# Patient Record
Sex: FEMALE | Race: WHITE | Employment: OTHER | URBAN - NONMETROPOLITAN AREA
[De-identification: names, ages, dates, MRNs, and addresses within clinical notes are randomized per-mention and may not be internally consistent; named-entity substitution may affect disease eponyms.]

---

## 2017-03-02 ENCOUNTER — HOSPITAL ENCOUNTER (OUTPATIENT)
Dept: GENERAL RADIOLOGY | Age: 67
Discharge: HOME | End: 2017-03-02
Payer: MEDICARE

## 2017-03-02 DIAGNOSIS — M54.17 RADICULOPATHY, LUMBOSACRAL REGION: ICD-10-CM

## 2017-03-02 PROCEDURE — 72110 X-RAY EXAM L-2 SPINE 4/>VWS: CPT

## 2017-03-02 PROCEDURE — 72100 X-RAY EXAM L-S SPINE 2/3 VWS: CPT

## 2020-08-04 ENCOUNTER — TRANSCRIBE ORDERS (OUTPATIENT)
Dept: ADMINISTRATIVE | Facility: HOSPITAL | Age: 70
End: 2020-08-04

## 2023-01-13 ENCOUNTER — TRANSCRIBE ORDERS (OUTPATIENT)
Dept: CT IMAGING | Facility: CLINIC | Age: 73
End: 2023-01-13
Payer: MEDICARE

## 2023-01-13 DIAGNOSIS — I38 VALVULAR HEART DISEASE: Primary | ICD-10-CM

## 2023-01-18 ENCOUNTER — TRANSCRIBE ORDERS (OUTPATIENT)
Dept: ADMINISTRATIVE | Age: 73
End: 2023-01-18

## 2023-01-18 DIAGNOSIS — M54.2 NECK PAIN WITHOUT INJURY: Primary | ICD-10-CM

## 2023-01-26 ENCOUNTER — HOSPITAL ENCOUNTER (OUTPATIENT)
Dept: MRI IMAGING | Age: 73
Discharge: HOME | End: 2023-01-26
Payer: MEDICARE

## 2023-01-26 DIAGNOSIS — M54.2 NECK PAIN WITHOUT INJURY: ICD-10-CM

## 2023-01-26 PROCEDURE — 72141 MRI NECK SPINE W/O DYE: CPT

## 2024-10-02 NOTE — PROGRESS NOTES
Pt is here today for a new pt visit for vaginal bleeding x 4 months, pt states she thinks her bladder has fell  
Community Support     Help with Day-to-Day Activities: Not on file     Lonely or Isolated: Not on file   Intimate Partner Violence: Unknown (10/11/2023)    Received from [facility]    Abuse Screen     Unsafe at Home or Work/School: Not on file     Feels Threatened by Someone?: Not on file     Does Anyone Keep You from Contacting Others or Doint Things Outside the Home?: Not on file     Physical Sign of Abuse Present: Not on file   Housing Stability: Unknown (10/11/2023)    Received from [facility]    Housing Stability     Current Living Arrangements: Not on file     Potentially Unsafe Housing Conditions: Not on file         Current Outpatient Medications:     citalopram (CELEXA) 20 MG tablet, Take 1 tablet by mouth daily, Disp: , Rfl:     furosemide (LASIX) 20 MG tablet, Take 1 tablet by mouth daily, Disp: , Rfl:     HYDROcodone-acetaminophen (NORCO)  MG per tablet, TAKE ONE TABLET BY MOUTH THREE TIMES DAILY. MUST LAST 30 DAYS, Disp: , Rfl:     pantoprazole (PROTONIX) 40 MG tablet, Take 1 tablet by mouth daily, Disp: , Rfl:     raloxifene (EVISTA) 60 MG tablet, Take 1 tablet by mouth daily, Disp: , Rfl:     topiramate (TOPAMAX) 50 MG tablet, Take 1 tablet by mouth daily, Disp: , Rfl:     Allergies   Allergen Reactions    Bactrim [Sulfamethoxazole-Trimethoprim] Hives    Codeine Hives    Penicillin G Itching       /74 (Site: Left Upper Arm, Position: Sitting, Cuff Size: Medium Adult)   Pulse 74   Ht 1.651 m (5' 5\")   Wt 57.2 kg (126 lb)   BMI 20.97 kg/m²   Physical Exam  Constitutional:       General: She is not in acute distress.     Appearance: Normal appearance. She is not ill-appearing or diaphoretic.   HENT:      Head: Normocephalic and atraumatic.      Nose: Nose normal. No rhinorrhea.   Eyes:      General: No scleral icterus.        Right eye: No discharge.         Left eye: No discharge.      Extraocular Movements: Extraocular movements intact.   Pulmonary:

## 2024-10-03 ENCOUNTER — OFFICE VISIT (OUTPATIENT)
Dept: OBGYN CLINIC | Age: 74
End: 2024-10-03

## 2024-10-03 VITALS
HEIGHT: 65 IN | WEIGHT: 126 LBS | HEART RATE: 74 BPM | BODY MASS INDEX: 20.99 KG/M2 | SYSTOLIC BLOOD PRESSURE: 116 MMHG | DIASTOLIC BLOOD PRESSURE: 74 MMHG

## 2024-10-03 DIAGNOSIS — N95.0 POST-MENOPAUSAL BLEEDING: Primary | ICD-10-CM

## 2024-10-03 DIAGNOSIS — N95.0 POST-MENOPAUSAL BLEEDING: ICD-10-CM

## 2024-10-03 DIAGNOSIS — R19.00 PELVIC MASS IN FEMALE: ICD-10-CM

## 2024-10-03 DIAGNOSIS — R63.4 ABNORMAL WEIGHT LOSS: ICD-10-CM

## 2024-10-03 DIAGNOSIS — N93.9 VAGINAL BLEEDING: ICD-10-CM

## 2024-10-03 DIAGNOSIS — N93.9 EXCESSIVE VAGINAL BLEEDING: ICD-10-CM

## 2024-10-03 DIAGNOSIS — N89.8 VAGINAL LESION: ICD-10-CM

## 2024-10-03 LAB
ALBUMIN SERPL-MCNC: 4 G/DL (ref 3.5–5.2)
ALP SERPL-CCNC: 82 U/L (ref 35–104)
ALT SERPL-CCNC: 7 U/L (ref 5–33)
ANION GAP SERPL CALCULATED.3IONS-SCNC: 11 MMOL/L (ref 7–19)
AST SERPL-CCNC: 11 U/L (ref 5–32)
BILIRUB SERPL-MCNC: 0.4 MG/DL (ref 0.2–1.2)
BUN SERPL-MCNC: 15 MG/DL (ref 8–23)
CALCIUM SERPL-MCNC: 8.9 MG/DL (ref 8.8–10.2)
CANCER AG125 SERPL-ACNC: 6 U/ML (ref 0–38)
CHLORIDE SERPL-SCNC: 106 MMOL/L (ref 98–111)
CO2 SERPL-SCNC: 24 MMOL/L (ref 22–29)
CREAT SERPL-MCNC: 0.8 MG/DL (ref 0.5–0.9)
ERYTHROCYTE [DISTWIDTH] IN BLOOD BY AUTOMATED COUNT: 13.9 % (ref 11.5–14.5)
GLUCOSE SERPL-MCNC: 83 MG/DL (ref 70–99)
HCT VFR BLD AUTO: 39.7 % (ref 37–47)
HGB BLD-MCNC: 12.6 G/DL (ref 12–16)
MCH RBC QN AUTO: 29.4 PG (ref 27–31)
MCHC RBC AUTO-ENTMCNC: 31.7 G/DL (ref 33–37)
MCV RBC AUTO: 92.8 FL (ref 81–99)
PLATELET # BLD AUTO: 233 K/UL (ref 130–400)
PMV BLD AUTO: 9.8 FL (ref 9.4–12.3)
POTASSIUM SERPL-SCNC: 4 MMOL/L (ref 3.5–5)
PROT SERPL-MCNC: 6.9 G/DL (ref 6.4–8.3)
RBC # BLD AUTO: 4.28 M/UL (ref 4.2–5.4)
SODIUM SERPL-SCNC: 141 MMOL/L (ref 136–145)
WBC # BLD AUTO: 9.6 K/UL (ref 4.8–10.8)

## 2024-10-03 RX ORDER — CITALOPRAM HYDROBROMIDE 20 MG/1
20 TABLET ORAL DAILY
COMMUNITY
Start: 2024-07-19

## 2024-10-03 RX ORDER — RALOXIFENE HYDROCHLORIDE 60 MG/1
60 TABLET, FILM COATED ORAL DAILY
COMMUNITY
Start: 2024-07-19

## 2024-10-03 RX ORDER — PANTOPRAZOLE SODIUM 40 MG/1
40 TABLET, DELAYED RELEASE ORAL DAILY
COMMUNITY
Start: 2024-07-19

## 2024-10-03 RX ORDER — HYDROCODONE BITARTRATE AND ACETAMINOPHEN 10; 325 MG/1; MG/1
TABLET ORAL
COMMUNITY
Start: 2024-09-04

## 2024-10-03 RX ORDER — TOPIRAMATE 50 MG/1
50 TABLET, FILM COATED ORAL DAILY
COMMUNITY
Start: 2024-07-19

## 2024-10-03 RX ORDER — FUROSEMIDE 20 MG
20 TABLET ORAL DAILY
COMMUNITY
Start: 2024-07-19

## 2024-10-08 LAB
HPV HR 12 DNA SPEC QL NAA+PROBE: NOT DETECTED
HPV16 DNA SPEC QL NAA+PROBE: NOT DETECTED
HPV16+18+H RISK 12 DNA SPEC-IMP: NORMAL
HPV18 DNA SPEC QL NAA+PROBE: NOT DETECTED

## 2024-12-26 ENCOUNTER — HOSPITAL ENCOUNTER (OUTPATIENT)
Dept: CT IMAGING | Age: 74
Discharge: HOME | End: 2024-12-26
Attending: OBSTETRICS & GYNECOLOGY
Payer: MEDICARE

## 2024-12-26 DIAGNOSIS — R19.00 PELVIC MASS IN FEMALE: ICD-10-CM

## 2024-12-26 DIAGNOSIS — N93.9 EXCESSIVE VAGINAL BLEEDING: ICD-10-CM

## 2024-12-26 DIAGNOSIS — N95.0 POST-MENOPAUSAL BLEEDING: ICD-10-CM

## 2024-12-26 DIAGNOSIS — R63.4 ABNORMAL WEIGHT LOSS: ICD-10-CM

## 2024-12-26 PROCEDURE — 6360000004 HC RX CONTRAST MEDICATION: Performed by: OBSTETRICS & GYNECOLOGY

## 2024-12-26 PROCEDURE — 74177 CT ABD & PELVIS W/CONTRAST: CPT

## 2024-12-26 RX ORDER — IOPAMIDOL 755 MG/ML
75 INJECTION, SOLUTION INTRAVASCULAR
Status: COMPLETED | OUTPATIENT
Start: 2024-12-26 | End: 2024-12-26

## 2024-12-26 RX ADMIN — IOPAMIDOL 75 ML: 755 INJECTION, SOLUTION INTRAVENOUS at 15:07

## 2025-01-15 ENCOUNTER — TELEPHONE (OUTPATIENT)
Dept: OBGYN CLINIC | Age: 75
End: 2025-01-15

## 2025-01-15 NOTE — TELEPHONE ENCOUNTER
Contacted radiology dept to request stat reading for pending CT from 12/26/24.     Pt daughter also informed.

## 2025-01-15 NOTE — TELEPHONE ENCOUNTER
Patient Daughter is requesting a return call in regards to CT results. Patient had CT completed on 12/26. Please return her call.    Thank you!

## 2025-01-21 ENCOUNTER — TELEPHONE (OUTPATIENT)
Dept: OBGYN CLINIC | Age: 75
End: 2025-01-21

## 2025-01-21 NOTE — TELEPHONE ENCOUNTER
----- Message from ADAMS MATAMOROS LPN sent at 1/21/2025  1:19 PM CST -----  Can someone call this patient's daughter and let her know that the CT came back okay. Dr novoa wants her to make an appt with a NP to follow up with her vaginal atrophy from here. The daughter has been calling.

## 2025-02-27 ENCOUNTER — OFFICE VISIT (OUTPATIENT)
Dept: OBGYN CLINIC | Age: 75
End: 2025-02-27
Payer: MEDICAID

## 2025-02-27 VITALS
HEART RATE: 80 BPM | WEIGHT: 122 LBS | DIASTOLIC BLOOD PRESSURE: 72 MMHG | SYSTOLIC BLOOD PRESSURE: 121 MMHG | BODY MASS INDEX: 20.3 KG/M2

## 2025-02-27 DIAGNOSIS — R10.2 PELVIC PAIN: ICD-10-CM

## 2025-02-27 DIAGNOSIS — N81.10 VAGINAL PROLAPSE: ICD-10-CM

## 2025-02-27 DIAGNOSIS — N93.9 VAGINAL BLEEDING: ICD-10-CM

## 2025-02-27 DIAGNOSIS — N95.2 VAGINAL ATROPHY: Primary | ICD-10-CM

## 2025-02-27 PROCEDURE — 1123F ACP DISCUSS/DSCN MKR DOCD: CPT | Performed by: NURSE PRACTITIONER

## 2025-02-27 PROCEDURE — G8427 DOCREV CUR MEDS BY ELIG CLIN: HCPCS | Performed by: NURSE PRACTITIONER

## 2025-02-27 PROCEDURE — G8400 PT W/DXA NO RESULTS DOC: HCPCS | Performed by: NURSE PRACTITIONER

## 2025-02-27 PROCEDURE — G8420 CALC BMI NORM PARAMETERS: HCPCS | Performed by: NURSE PRACTITIONER

## 2025-02-27 PROCEDURE — 99214 OFFICE O/P EST MOD 30 MIN: CPT | Performed by: NURSE PRACTITIONER

## 2025-02-27 PROCEDURE — 1036F TOBACCO NON-USER: CPT | Performed by: NURSE PRACTITIONER

## 2025-02-27 PROCEDURE — 1090F PRES/ABSN URINE INCON ASSESS: CPT | Performed by: NURSE PRACTITIONER

## 2025-02-27 PROCEDURE — 3017F COLORECTAL CA SCREEN DOC REV: CPT | Performed by: NURSE PRACTITIONER

## 2025-02-27 RX ORDER — ESTRADIOL 0.1 MG/G
CREAM VAGINAL
Qty: 1 EACH | Refills: 3 | Status: SHIPPED | OUTPATIENT
Start: 2025-02-27

## 2025-02-27 ASSESSMENT — ENCOUNTER SYMPTOMS
CONSTIPATION: 0
DIARRHEA: 0
ALLERGIC/IMMUNOLOGIC NEGATIVE: 1
EYES NEGATIVE: 1
RESPIRATORY NEGATIVE: 1
ABDOMINAL PAIN: 1

## 2025-02-27 NOTE — PROGRESS NOTES
Tatiana Camejo is a 74 y.o. female who presents today for her medical conditions/ complaints as noted below. Tatiana Camejo is c/o of Vaginal Atrophy         HPI  History of Present Illness  The patient presents for evaluation of vaginal bleeding and bladder prolapse.    She has been experiencing significant bladder discomfort since April 2024. History of partial hysterectomy performed due to uterine prolapse. She reports a sensation of incomplete bladder emptying and urinary incontinence, necessitating the constant use of a pad. She also experiences abdominal pain during ambulation. She has previously undergone bladder surgery to correct a prolapse, which has since recurred. Pt reports \"bladder tack.\" She recalls the use of a rubber device for bladder support in the past, which was associated with pain. She maintains an active lifestyle, including performing laundry duties, but occasionally experiences constipation and straining during bowel movements.    Supplemental Information  She had osteoporosis before and is currently on Evista (raloxifene) for osteoporosis prevention. She broke her bones and was diagnosed with osteoporosis. She received injections and was later put on Evista.    MEDICATIONS  Current: Evista (raloxifene)      No LMP recorded. Patient has had a hysterectomy.  No obstetric history on file.    Past Medical History:   Diagnosis Date    Arthritis      Past Surgical History:   Procedure Laterality Date    BLADDER SURGERY      GALLBLADDER SURGERY      HERNIA REPAIR      PARTIAL HYSTERECTOMY (CERVIX NOT REMOVED)       History reviewed. No pertinent family history.  Social History     Tobacco Use    Smoking status: Never    Smokeless tobacco: Never   Substance Use Topics    Alcohol use: Never       Current Outpatient Medications   Medication Sig Dispense Refill    estradiol (ESTRACE VAGINAL) 0.1 MG/GM vaginal cream Insert 1 gram vaginally at bedtime x7 nights, and then 3 nights a week 1 each 3

## 2025-04-15 ENCOUNTER — OFFICE VISIT (OUTPATIENT)
Dept: OBGYN CLINIC | Age: 75
End: 2025-04-15

## 2025-04-15 VITALS
BODY MASS INDEX: 20.16 KG/M2 | WEIGHT: 121 LBS | DIASTOLIC BLOOD PRESSURE: 68 MMHG | HEIGHT: 65 IN | SYSTOLIC BLOOD PRESSURE: 128 MMHG

## 2025-04-15 DIAGNOSIS — N93.9 VAGINAL BLEEDING: ICD-10-CM

## 2025-04-15 DIAGNOSIS — N81.10 VAGINAL PROLAPSE: ICD-10-CM

## 2025-04-15 DIAGNOSIS — N95.2 VAGINAL ATROPHY: Primary | ICD-10-CM

## 2025-04-15 DIAGNOSIS — N90.89 VULVAR LESION: ICD-10-CM

## 2025-04-15 ASSESSMENT — ENCOUNTER SYMPTOMS
CONSTIPATION: 0
EYES NEGATIVE: 1
DIARRHEA: 0
RESPIRATORY NEGATIVE: 1
GASTROINTESTINAL NEGATIVE: 1
ALLERGIC/IMMUNOLOGIC NEGATIVE: 1

## 2025-04-15 NOTE — PROGRESS NOTES
Constitutional: Negative.    HENT:  Positive for hearing loss.    Eyes: Negative.    Respiratory: Negative.     Cardiovascular: Negative.    Gastrointestinal: Negative.  Negative for constipation and diarrhea.   Endocrine: Negative.    Genitourinary:  Positive for genital sores, vaginal bleeding and vaginal pain. Negative for frequency, menstrual problem and urgency.   Musculoskeletal: Negative.    Skin: Negative.    Allergic/Immunologic: Negative.    Neurological: Negative.    Hematological: Negative.    Psychiatric/Behavioral: Negative.     All other systems reviewed and are negative.        Physical Exam  Vitals and nursing note reviewed.   Constitutional:       Appearance: She is well-developed.   HENT:      Head: Normocephalic.      Right Ear: External ear normal.      Left Ear: External ear normal.      Nose: Nose normal.   Genitourinary:     Labia:         Right: Lesion present.       Vagina: Erythema, tenderness, bleeding, lesions and prolapsed vaginal walls present.          Comments: Small 1cm irregularly shaped lesion at 7 o clock to vaginal introitus, rough patch, white  Bleeding and atrophy at base of vaginal introitus, small fissure present  Grade 3 cystocele  Vaginal atrophy and narrowing, firm ridge of tissue present      Vulvar Biopsy Procedure Note    Pre-operative Diagnosis: Vulvar Lesion    Post-operative Diagnosis: Vulvar Lesion    Indications: Lesion    Procedure Details    The risks (including infection, bleeding, pain) and benefits of the procedure were explained to the patient and Verbal informed consent was obtained.      The patient was placed in the dorsal lithotomy position. Lesion visualized and cleansed with betadine. 1cc of 2% lidocaine injected subcutaneously. 3mm punch biopsy used to excise small amount of lesion. No sutures needed. Hemostasis achieved with silver nitrate. Sent for pathology. Pt tolerated well.       Condition:  Stable    Complications:  None    Plan:    The

## 2025-04-22 ENCOUNTER — RESULTS FOLLOW-UP (OUTPATIENT)
Dept: OBGYN CLINIC | Age: 75
End: 2025-04-22

## 2025-04-22 RX ORDER — TRIAMCINOLONE ACETONIDE 1 MG/G
OINTMENT TOPICAL 2 TIMES DAILY
Qty: 1 EACH | Refills: 0 | Status: SHIPPED | OUTPATIENT
Start: 2025-04-22 | End: 2025-04-29

## 2025-04-24 ENCOUNTER — TELEPHONE (OUTPATIENT)
Dept: OBGYN CLINIC | Age: 75
End: 2025-04-24

## 2025-04-24 NOTE — TELEPHONE ENCOUNTER
Patient Daughter is requesting a return in regards to Biopsy results. Please return call.    Thank you!

## 2025-04-25 ENCOUNTER — TELEPHONE (OUTPATIENT)
Dept: OBGYN CLINIC | Age: 75
End: 2025-04-25

## 2025-04-25 NOTE — TELEPHONE ENCOUNTER
Patient daughter called stating that the medication that was sent in to the pharmacy is not working for her mom.Told patient daughter that the provider and medical assistant is not here this afternoon and that they will get back with her on Monday.

## 2025-05-09 ENCOUNTER — OFFICE VISIT (OUTPATIENT)
Dept: OBGYN CLINIC | Age: 75
End: 2025-05-09
Payer: MEDICAID

## 2025-05-09 VITALS
HEART RATE: 80 BPM | SYSTOLIC BLOOD PRESSURE: 127 MMHG | DIASTOLIC BLOOD PRESSURE: 78 MMHG | BODY MASS INDEX: 20.05 KG/M2 | WEIGHT: 120.5 LBS

## 2025-05-09 DIAGNOSIS — Z46.89 ENCOUNTER FOR FITTING AND ADJUSTMENT OF PESSARY: ICD-10-CM

## 2025-05-09 DIAGNOSIS — N95.2 VAGINAL ATROPHY: Primary | ICD-10-CM

## 2025-05-09 DIAGNOSIS — R10.2 PELVIC PAIN: ICD-10-CM

## 2025-05-09 DIAGNOSIS — N81.10 VAGINAL PROLAPSE: ICD-10-CM

## 2025-05-09 DIAGNOSIS — N93.9 VAGINAL BLEEDING: ICD-10-CM

## 2025-05-09 PROCEDURE — 1090F PRES/ABSN URINE INCON ASSESS: CPT | Performed by: NURSE PRACTITIONER

## 2025-05-09 PROCEDURE — G8400 PT W/DXA NO RESULTS DOC: HCPCS | Performed by: NURSE PRACTITIONER

## 2025-05-09 PROCEDURE — 57160 INSERT PESSARY/OTHER DEVICE: CPT | Performed by: NURSE PRACTITIONER

## 2025-05-09 PROCEDURE — G8420 CALC BMI NORM PARAMETERS: HCPCS | Performed by: NURSE PRACTITIONER

## 2025-05-09 PROCEDURE — G8427 DOCREV CUR MEDS BY ELIG CLIN: HCPCS | Performed by: NURSE PRACTITIONER

## 2025-05-09 PROCEDURE — 3017F COLORECTAL CA SCREEN DOC REV: CPT | Performed by: NURSE PRACTITIONER

## 2025-05-09 PROCEDURE — 1123F ACP DISCUSS/DSCN MKR DOCD: CPT | Performed by: NURSE PRACTITIONER

## 2025-05-09 PROCEDURE — 99214 OFFICE O/P EST MOD 30 MIN: CPT | Performed by: NURSE PRACTITIONER

## 2025-05-09 PROCEDURE — 1036F TOBACCO NON-USER: CPT | Performed by: NURSE PRACTITIONER

## 2025-05-09 RX ORDER — ESTRADIOL 0.1 MG/G
CREAM VAGINAL
Qty: 1 EACH | Refills: 3 | Status: SHIPPED | OUTPATIENT
Start: 2025-05-09

## 2025-05-09 ASSESSMENT — ENCOUNTER SYMPTOMS
CONSTIPATION: 0
ALLERGIC/IMMUNOLOGIC NEGATIVE: 1
RESPIRATORY NEGATIVE: 1
DIARRHEA: 0
ABDOMINAL PAIN: 1

## 2025-05-09 NOTE — PROGRESS NOTES
Pt presents today with c/o bladder prolapse, significant pelvic pain and estradiol not helping. States cream does not work.

## 2025-05-09 NOTE — PROGRESS NOTES
Tatiana Camejo is a 74 y.o. female who presents today for her medical conditions/ complaints as noted below. Tatiana Camejo is c/o of No chief complaint on file.        HPI  History of Present Illness  The patient presents for evaluation of bladder prolapse.    She reports persistent discomfort due to a sensation of a full bladder, which she believes is causing her inability to urinate. She also experiences ongoing bleeding. The severity of her symptoms has escalated to the point where she is unable to walk or stand without experiencing significant pain. She has been using a cream three times weekly but reports no improvement in her condition. She has been utilizing a heating pad for pain relief. Her urinary output is minimal, often going an entire day without urinating. Despite maintaining adequate hydration, she continues to experience these symptoms. She also reports difficulty with bowel movements, describing them as painful. She has undergone diagnostic imaging, including CT and MRI scans, tissue biopsies in vagina and vulva which have not revealed any abnormalities in her abdomen.    Interested in next steps with second opinion with Uro/Gyn and surgical options. Has attempted a pessary with another office and states \"it fell out.\" Pt strongly desires intervention \"today, because I am miserable with it hanging out.\"    No LMP recorded. Patient has had a hysterectomy.      Past Medical History:   Diagnosis Date    Arthritis      Past Surgical History:   Procedure Laterality Date    BLADDER SURGERY      GALLBLADDER SURGERY      HERNIA REPAIR      PARTIAL HYSTERECTOMY (CERVIX NOT REMOVED)       No family history on file.  Social History     Tobacco Use    Smoking status: Never    Smokeless tobacco: Never   Substance Use Topics    Alcohol use: Never       Current Outpatient Medications   Medication Sig Dispense Refill    estradiol (ESTRACE VAGINAL) 0.1 MG/GM vaginal cream Insert 1 gram vaginally at bedtime x7

## 2025-05-10 ENCOUNTER — HOSPITAL ENCOUNTER (EMERGENCY)
Age: 75
Discharge: HOME | End: 2025-05-10
Payer: MEDICARE

## 2025-05-10 VITALS
OXYGEN SATURATION: 96 % | DIASTOLIC BLOOD PRESSURE: 66 MMHG | RESPIRATION RATE: 18 BRPM | TEMPERATURE: 98.3 F | HEART RATE: 76 BPM | SYSTOLIC BLOOD PRESSURE: 128 MMHG

## 2025-05-10 DIAGNOSIS — T83.89XA VAGINAL IRRITATION FROM PESSARY: Primary | ICD-10-CM

## 2025-05-10 DIAGNOSIS — N89.8 VAGINAL IRRITATION FROM PESSARY: Primary | ICD-10-CM

## 2025-05-10 PROCEDURE — 99282 EMERGENCY DEPT VISIT SF MDM: CPT

## 2025-05-10 NOTE — DISCHARGE INSTRUCTIONS
Today you were seen for your pessary coming out during the night.  In the ER it is nothing we would place back.  You do need to follow-up with your gynecologist to have it placed back.  Call them on Monday for further treatment options.  Unfortunately due to you not wanting to have an assessment I cannot rule out any injury or infection at this time.  If your pain worsens or you have any new symptoms please go to the closest ER.

## 2025-05-10 NOTE — ED PROVIDER NOTES
[sulfamethoxazole-trimethoprim], Codeine, and Penicillin g    FAMILY HISTORY     History reviewed. No pertinent family history.       SOCIAL HISTORY       Social History     Socioeconomic History    Marital status:      Spouse name: None    Number of children: None    Years of education: None    Highest education level: None   Tobacco Use    Smoking status: Never    Smokeless tobacco: Never   Vaping Use    Vaping status: Never Used   Substance and Sexual Activity    Alcohol use: Never    Drug use: Never     Social Drivers of Health      Received from [facility]    Family and Community Support    Received from [facility]    Abuse Screen    Received from [facility]    Housing Stability       SCREENINGS    Sera Coma Scale  Eye Opening: Spontaneous  Best Verbal Response: Oriented  Best Motor Response: Obeys commands  Henry Coma Scale Score: 15        PHYSICAL EXAM    (up to 7 for level 4, 8 or more for level 5)     ED Triage Vitals   BP Systolic BP Percentile Diastolic BP Percentile Temp Temp src Pulse Respirations SpO2   05/10/25 1136 -- -- 05/10/25 1135 -- 05/10/25 1136 05/10/25 1135 05/10/25 1135   128/66   98.3 °F (36.8 °C)  76 18 96 %      Height Weight         -- --                       Physical Exam  Constitutional:       General: She is not in acute distress.     Appearance: Normal appearance.   Neurological:      General: No focal deficit present.      Mental Status: She is alert and oriented to person, place, and time.   Psychiatric:         Mood and Affect: Mood normal. Affect is angry.         Behavior: Behavior is agitated.      Comments: Patient appeared agitated and angry when I entered the room, she was unwilling to have physical exam.         DIAGNOSTIC RESULTS     EKG: All EKG's are interpreted by the Emergency Department Physician and are utilized in the medical decision making for this patient.          RADIOLOGY:  Non-plain film images such  as CT, Ultrasound and MRI are read by the radiologist. Plain radiographic images are visualized and preliminarily interpreted bythe emergency physician with the below findings:          No orders to display           LABS:  Labs Reviewed - No data to display    All other labs were within normal range or not returned as of this dictation.    EMERGENCY DEPARTMENT COURSE and DIFFERENTIAL DIAGNOSIS/MDM:   Vitals:    Vitals:    05/10/25 1135 05/10/25 1136   BP:  128/66   Pulse:  76   Resp: 18    Temp: 98.3 °F (36.8 °C)    SpO2: 96%        MDM  Number of Diagnoses or Management Options  Vaginal irritation from pessary  Diagnosis management comments: Patient refused physical exam after her and I discussed that it would not be possible for me to place her pessary back.  I explained that she would need to follow-up with gynecology on Monday and they can discuss a plan of further plans to replace it.  It was recently placed on Friday so less than 24 hours ago.  It is not been in for a long period of time.  Patient endorsed that she was in pain that has been ongoing for many months.  Patient has multiple creams at home that she can use she did gave verbal understanding that refusing the assessment there is a risk that I could miss infection, UTI inflammation.  Patient stated unless I was willing to place the pessary she did not want to have physical exam and wanted to leave at this time.  Patient had asked that gynecology be brought down to have it placed.  I explained that this was not a medical emergency especially without a physical assessment I would not be able to contact any specialist related to the issue.  She stated that she did not want an assessment.  Patient was given discharge papers and told to come back to the ER if her symptoms worsen.          CONSULTS:  None      Unless otherwise noted below, none     Procedures    FINAL IMPRESSION      1. Vaginal irritation from pessary          DISPOSITION/PLAN

## 2025-05-12 ENCOUNTER — TELEPHONE (OUTPATIENT)
Dept: OBGYN CLINIC | Age: 75
End: 2025-05-12

## 2025-05-12 NOTE — TELEPHONE ENCOUNTER
Tatiana's daughter called to reschedule an appointment for Office Visit.  Needs earlier time due picking kids up from school. Appt is scheduled for 30 mins. [facility] was unable to accommodate in the time frame needed. Please be advised that the best time to call Anyt[facility].    Thank you.

## 2025-05-13 ENCOUNTER — OFFICE VISIT (OUTPATIENT)
Dept: OBGYN CLINIC | Age: 75
End: 2025-05-13
Payer: MEDICARE

## 2025-05-13 VITALS
WEIGHT: 118 LBS | SYSTOLIC BLOOD PRESSURE: 123 MMHG | HEART RATE: 82 BPM | BODY MASS INDEX: 19.64 KG/M2 | DIASTOLIC BLOOD PRESSURE: 76 MMHG

## 2025-05-13 DIAGNOSIS — Z46.89 PESSARY MAINTENANCE: ICD-10-CM

## 2025-05-13 DIAGNOSIS — N81.10 VAGINAL PROLAPSE: Primary | ICD-10-CM

## 2025-05-13 DIAGNOSIS — Z46.89 ENCOUNTER FOR FITTING AND ADJUSTMENT OF PESSARY: ICD-10-CM

## 2025-05-13 DIAGNOSIS — N95.2 VAGINAL ATROPHY: ICD-10-CM

## 2025-05-13 PROCEDURE — G8427 DOCREV CUR MEDS BY ELIG CLIN: HCPCS | Performed by: NURSE PRACTITIONER

## 2025-05-13 PROCEDURE — 57160 INSERT PESSARY/OTHER DEVICE: CPT | Performed by: NURSE PRACTITIONER

## 2025-05-13 PROCEDURE — G8420 CALC BMI NORM PARAMETERS: HCPCS | Performed by: NURSE PRACTITIONER

## 2025-05-13 PROCEDURE — 3017F COLORECTAL CA SCREEN DOC REV: CPT | Performed by: NURSE PRACTITIONER

## 2025-05-13 PROCEDURE — 1090F PRES/ABSN URINE INCON ASSESS: CPT | Performed by: NURSE PRACTITIONER

## 2025-05-13 PROCEDURE — 1123F ACP DISCUSS/DSCN MKR DOCD: CPT | Performed by: NURSE PRACTITIONER

## 2025-05-13 PROCEDURE — 99213 OFFICE O/P EST LOW 20 MIN: CPT | Performed by: NURSE PRACTITIONER

## 2025-05-13 PROCEDURE — 1036F TOBACCO NON-USER: CPT | Performed by: NURSE PRACTITIONER

## 2025-05-13 PROCEDURE — G8400 PT W/DXA NO RESULTS DOC: HCPCS | Performed by: NURSE PRACTITIONER

## 2025-05-13 PROCEDURE — 1159F MED LIST DOCD IN RCRD: CPT | Performed by: NURSE PRACTITIONER

## 2025-05-13 ASSESSMENT — ENCOUNTER SYMPTOMS
DIARRHEA: 0
CONSTIPATION: 0
ALLERGIC/IMMUNOLOGIC NEGATIVE: 1
RESPIRATORY NEGATIVE: 1
GASTROINTESTINAL NEGATIVE: 1
EYES NEGATIVE: 1

## 2025-05-13 NOTE — PROGRESS NOTES
Tatiana Camejo is a 74 y.o. female who presents today for her medical conditions/ complaints as noted below. Tatiana Camejo is c/o of Follow-up (Pessary )        HPI  History of Present Illness  The patient presents for evaluation of a pessary.    She reports that the pessary dislodged at 2:00 AM while she was in the bathroom the day after it was inserted. Immediate medical attention was sought at the emergency room between 9:00 and 11:00 AM, but the on-call OB/GYN was not contacted due to the non-emergent nature of the issue. Severe pain necessitated the use of a wheelchair. No call has been received from [facility] regarding her referral. Previous attempts with a ring and cube pessary were unsuccessful. No vaginal bleeding has been reported. The vaginal prolapse is less pronounced when seated compared to standing. Mobility is significantly compromised due to the pain associated with the condition.      No LMP recorded. Patient has had a hysterectomy.      Past Medical History:   Diagnosis Date    Arthritis      Past Surgical History:   Procedure Laterality Date    BLADDER SURGERY      GALLBLADDER SURGERY      HERNIA REPAIR      PARTIAL HYSTERECTOMY (CERVIX NOT REMOVED)       No family history on file.  Social History     Tobacco Use    Smoking status: Never    Smokeless tobacco: Never   Substance Use Topics    Alcohol use: Never       Current Outpatient Medications   Medication Sig Dispense Refill    estradiol (ESTRACE VAGINAL) 0.1 MG/GM vaginal cream Insert 1 gram vaginally at bedtime x7 nights, and then 3 nights a week 1 each 3    citalopram (CELEXA) 20 MG tablet Take 1 tablet by mouth daily      furosemide (LASIX) 20 MG tablet Take 1 tablet by mouth daily      HYDROcodone-acetaminophen (NORCO)  MG per tablet TAKE ONE TABLET BY MOUTH THREE TIMES DAILY. MUST LAST 30 DAYS      pantoprazole (PROTONIX) 40 MG tablet Take 1 tablet by mouth daily      raloxifene (EVISTA) 60 MG tablet Take 1 tablet by mouth

## 2025-05-15 ENCOUNTER — TELEPHONE (OUTPATIENT)
Dept: OBGYN CLINIC | Age: 75
End: 2025-05-15

## 2025-05-15 NOTE — TELEPHONE ENCOUNTER
Patients daughter reached out to inform office that the patients pessary had fallen out again and that they would like an ASAP appointment with Dr. Corral in [place].

## 2025-05-15 NOTE — TELEPHONE ENCOUNTER
I was not here on the day the referral was put in. I called Dr Corral's office to schedule pt but they do fax referrals. Pt's records have been faxed and pt's daughter (Cassandra) informed. Gabby/DARIEL